# Patient Record
Sex: FEMALE | Race: AMERICAN INDIAN OR ALASKA NATIVE
[De-identification: names, ages, dates, MRNs, and addresses within clinical notes are randomized per-mention and may not be internally consistent; named-entity substitution may affect disease eponyms.]

---

## 2019-10-08 ENCOUNTER — HOSPITAL ENCOUNTER (EMERGENCY)
Dept: HOSPITAL 5 - ED | Age: 54
Discharge: HOME | End: 2019-10-08
Payer: SELF-PAY

## 2019-10-08 VITALS — DIASTOLIC BLOOD PRESSURE: 76 MMHG | SYSTOLIC BLOOD PRESSURE: 134 MMHG
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
## 2019-10-08 DIAGNOSIS — S92.351A: Primary | ICD-10-CM
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
## 2019-10-08 NOTE — EVENT NOTE
ED Screening Note


Date of service: 10/08/19


Time: 16:21


ED Screening Note: 


54 y o  f tripped lost stepping cc of right foot pain and swelling





This initial assessment/diagnostic orders/clinical plan/treatment(s) is/are 

subject to change based on patients health status, clinical progression and re-

assessment by fellow clinical providers in the ED. Further treatment and workup 

at subsequent clinical providers discretion. Patient/guardian urged not to elope

from the ED as their condition may be serious if not clinically assessed and 

managed. 





Initial orders include: 


xr foot

## 2019-10-08 NOTE — EMERGENCY DEPARTMENT REPORT
ED Back Pain/Injury HPI





- General


Chief Complaint: Extremity Injury, Lower


Stated Complaint: RT FOOT INJURY/PAIN


Time Seen by Provider: 10/08/19 16:21


Source: patient


Limitations: No Limitations





- History of Present Illness


Initial Comments: 





55 yo comes to ER with right foot pain p rolling it yesterday. OTC meds not 

helping pain. Pt ambulatory with limp. no other injury; denies fall








-: Sudden


Similar Symptoms Previously: No


Place: home


Radiation: none


Severity: moderate


Severity scale (0 -10): 4


Quality: aching


Consistency: intermittent


Worsens With: movement


Associated Symptoms: denies other symptoms





- Related Data


                                  Previous Rx's











 Medication  Instructions  Recorded  Last Taken  Type


 


Ibuprofen [Motrin] 800 mg PO Q8HR PRN #30 tablet 10/08/19 Unknown Rx











                                    Allergies











Allergy/AdvReac Type Severity Reaction Status Date / Time


 


No Known Allergies Allergy   Verified 10/08/19 16:20














ED Review of Systems


ROS: 


Stated complaint: RT FOOT INJURY/PAIN


Other details as noted in HPI





Comment: All other systems reviewed and negative





ED Past Medical Hx





- Past Medical History


Medical history: no medical history





ED Back Pain Physical Exam





- Exam


General: 


Vital signs noted. No distress. Alert and acting appropriately.


alert and oriented


no focal deficit


s1s2


lungs cta


abd snt


no spine tenderness


full rom ankle and knee bilat.


bruising noted over 5th digit of rle


rapid cap refill


dp plus 2 bilateral


mood and affect appropriate


Back/Abdomen: No Abdominal Tenderness, No Perithoracic Tenderness, No Perilumbar

 Tenderness, No Sacroiliac Tenderness, No Flank Tenderness, No Straight Leg 

Raise Pain


Neuro: Yes Normal Sensation, Yes Normal DTR's, Yes Normal Gait, No Motor 

Weakness





ED Course


                                   Vital Signs











  10/08/19





  16:19


 


Temperature 98.5 F


 


Pulse Rate 72


 


Respiratory 16





Rate 


 


Blood Pressure 134/76





[Right] 


 


O2 Sat by Pulse 95





Oximetry 














Ed Back Pain Tests





- Tests


Tests: Abnormal X Rays





ED Medical Decision Making





- Radiology Data


Radiology results: report reviewed, image reviewed





- Medical Decision Making





                                   Vital Signs











  10/08/19





  16:19


 


Temperature 98.5 F


 


Pulse Rate 72


 


Respiratory 16





Rate 


 


Blood Pressure 134/76





[Right] 


 


O2 Sat by Pulse 95





Oximetry 








xray noted





medicated for pain





splint/crutches





dc home with dc plan of care and follow up. pt verbalizes understanding. 





- Differential Diagnosis


ro fx


Critical care attestation.: 


If time is entered above; I have spent that time in minutes in the direct care 

of this critically ill patient, excluding procedure time.








ED Disposition


Clinical Impression: 


 Fracture of metatarsal bone of right foot





Disposition: DC-01 TO HOME OR SELFCARE


Is pt being admited?: No


Does the pt Need Aspirin: No


Condition: Stable


Instructions:  Foot Fracture in Adults (ED)


Additional Instructions: 


ice





rest





splint





elevated





crutches





motrin for pain





follow up with [facility]TH[facility] 


referral below 








Prescriptions: 


Ibuprofen [Motrin] 800 mg PO Q8HR PRN #30 tablet


 PRN Reason: Pain, Moderate (4-6)


Referrals: 


SARBJIT FAGAN MD [Staff Physician] - 3-5 Days


Forms:  Work/School Release Form(ED)


Time of Disposition: 17:49

## 2022-10-16 NOTE — XRAY REPORT
RIGHT FOOT, 2 VIEWS

10/8/2019



INDICATION / CLINICAL INFORMATION:

pain and swelling.



COMPARISON:

None available.



FINDINGS:

Ununited fracture of the proximal fifth metatarsal.



Signer Name: Gómez Bryant MD 

Signed: 10/8/2019 5:39 PM

 Workstation Name: [id number]

## 2024-10-02 ENCOUNTER — LAB OUTSIDE AN ENCOUNTER (OUTPATIENT)
Dept: URBAN - METROPOLITAN AREA SURGERY CENTER 20 | Facility: SURGERY CENTER | Age: 59
End: 2024-10-02

## 2024-10-04 ENCOUNTER — OFFICE VISIT (OUTPATIENT)
Dept: URBAN - METROPOLITAN AREA SURGERY CENTER 20 | Facility: SURGERY CENTER | Age: 59
End: 2024-10-04
Payer: COMMERCIAL

## 2024-10-04 DIAGNOSIS — K57.30 DIVERTICULOSIS OF COLON: ICD-10-CM

## 2024-10-04 DIAGNOSIS — K64.8 OTHER HEMORRHOIDS: ICD-10-CM

## 2024-10-04 DIAGNOSIS — Z12.11 ENCOUNTER SCREENING FOR MALIGNANT NEOPLASM OF COLON: ICD-10-CM

## 2024-10-04 DIAGNOSIS — Z12.11 COLON CANCER SCREENING: ICD-10-CM

## 2024-10-04 PROCEDURE — 0528F RCMND FLW-UP 10 YRS DOCD: CPT | Performed by: INTERNAL MEDICINE

## 2024-10-04 PROCEDURE — 00812 ANES LWR INTST SCR COLSC: CPT | Performed by: NURSE ANESTHETIST, CERTIFIED REGISTERED

## 2024-10-04 PROCEDURE — G0121 COLON CA SCRN NOT HI RSK IND: HCPCS | Performed by: INTERNAL MEDICINE
